# Patient Record
Sex: MALE | Race: WHITE | NOT HISPANIC OR LATINO | URBAN - METROPOLITAN AREA
[De-identification: names, ages, dates, MRNs, and addresses within clinical notes are randomized per-mention and may not be internally consistent; named-entity substitution may affect disease eponyms.]

---

## 2023-02-23 PROBLEM — L03.031 PARONYCHIA OF TOE OF RIGHT FOOT: Status: ACTIVE | Noted: 2023-02-23

## 2023-02-23 PROBLEM — R01.1 NEWLY RECOGNIZED HEART MURMUR: Status: ACTIVE | Noted: 2023-02-23

## 2023-02-23 PROBLEM — J30.89 OTHER ALLERGIC RHINITIS: Status: ACTIVE | Noted: 2023-02-23

## 2023-02-23 PROBLEM — R55 SYNCOPE: Status: ACTIVE | Noted: 2023-02-23

## 2023-02-23 PROBLEM — L30.9 DERMATITIS, ECZEMATOID: Status: ACTIVE | Noted: 2023-02-23

## 2023-02-23 PROBLEM — J45.909 ASTHMA (HHS-HCC): Status: ACTIVE | Noted: 2023-02-23

## 2023-02-23 RX ORDER — ALBUTEROL SULFATE 90 UG/1
2 AEROSOL, METERED RESPIRATORY (INHALATION)
COMMUNITY
Start: 2015-09-25 | End: 2023-03-22 | Stop reason: SDUPTHER

## 2023-02-23 RX ORDER — CRISABOROLE 20 MG/G
OINTMENT TOPICAL
COMMUNITY
Start: 2020-02-19

## 2023-02-23 RX ORDER — TRIAMCINOLONE ACETONIDE 1 MG/G
OINTMENT TOPICAL
COMMUNITY
Start: 2016-02-24

## 2023-02-23 RX ORDER — MOMETASONE FUROATE 220 UG/1
INHALANT RESPIRATORY (INHALATION)
COMMUNITY
Start: 2020-06-22 | End: 2023-03-22 | Stop reason: SDUPTHER

## 2023-02-23 RX ORDER — CETIRIZINE HYDROCHLORIDE 10 MG/1
1 TABLET ORAL DAILY
COMMUNITY
Start: 2020-02-19

## 2023-03-09 ENCOUNTER — APPOINTMENT (OUTPATIENT)
Dept: PRIMARY CARE | Facility: CLINIC | Age: 23
End: 2023-03-09
Payer: COMMERCIAL

## 2023-03-09 PROBLEM — L03.031 PARONYCHIA OF TOE OF RIGHT FOOT: Status: RESOLVED | Noted: 2023-02-23 | Resolved: 2023-03-09

## 2023-03-09 PROBLEM — R55 SYNCOPE: Status: RESOLVED | Noted: 2023-02-23 | Resolved: 2023-03-09

## 2023-03-09 ASSESSMENT — ENCOUNTER SYMPTOMS
SORE THROAT: 0
DIARRHEA: 0
COUGH: 0
NERVOUS/ANXIOUS: 0
HEADACHES: 0
CONSTIPATION: 0
EYE REDNESS: 0
RHINORRHEA: 0
VOMITING: 0
PALPITATIONS: 0
ARTHRALGIAS: 0
FATIGUE: 0
BLOOD IN STOOL: 0
HEMATURIA: 0
DYSPHORIC MOOD: 0
EYE DISCHARGE: 0
FEVER: 0
DIZZINESS: 0
POLYPHAGIA: 0
WHEEZING: 0
FREQUENCY: 0
POLYDIPSIA: 0
DYSURIA: 0
CHILLS: 0
NAUSEA: 0
MYALGIAS: 0
ABDOMINAL PAIN: 0
BRUISES/BLEEDS EASILY: 0
ADENOPATHY: 0
SHORTNESS OF BREATH: 0

## 2023-03-09 NOTE — PROGRESS NOTES
Subjective   Patient ID: Jonathan Culver is a 22 y.o. male who presents for No chief complaint on file..  Is pt fasting? ***  Does pt see any providers other than care team below: ***  new pt-marvin-last pcp, others  Last seen by dr rowe in 2020  Put names of providers in care team-not last pcp  last physical  last labs  is pt fasting?  Tdap-last one 2013; would he like to update this today  flu shot unavailable  Gardasil-would he like this today    Family history form      No care team member to display    HPI  Last labs-  Due for labs- ***    No results found for: CHOL  No results found for: TRIG  No results found for: HDL  No results found for: LDL  No results found for: TSH  No components found for: A1C  No components found for: POCA1C  No results found for: ALBUR  No components found for: POCALBUR      Other concerns:    bps at home- ***    ER/urgicare visits in the last year- ***  Hospitalizations in the last year- ***    last colonoscopy/cologuard/FIT (45-75)  FH colon ca-  FH prostate ca-    Exercise- ***  Diet-***   There is no height or weight on file to calculate BMI.    last eye  appt-   No vision issues    last dental appt- ***    BMs-regular  Sleep-able to fall asleep and stay asleep; no snoring or apnea  no cp, swelling, sob, abd pain, n/v/d/c, blood in stool or black stools  STI testing including hiv (age 15-65) and hep c screening (18-79)-***        Immunization History   Administered Date(s) Administered    DTaP 2000, 05/23/2001, 05/31/2002, 02/01/2005    Hep B, Unspecified 2000, 05/23/2001, 05/31/2002    HiB, unspecified 2000, 05/23/2001, 05/31/2002    Influenza, seasonal, injectable 01/08/2013    MMR 05/31/2002, 05/15/2006    Meningococcal, Unknown Serogroups 01/08/2013, 09/21/2018    Polio, Unspecified 2000, 05/23/2001, 05/31/2002, 02/01/2006    Tdap 01/08/2013    Varicella 08/30/2006       fractures in lifetime-***      FH heart attack, heart surgery-***  FH  stroke-***    The ASCVD Risk score (Sandy DK, et al., 2019) failed to calculate for the following reasons:    The 2019 ASCVD risk score is only valid for ages 40 to 79      Review of Systems   Constitutional:  Negative for chills, fatigue and fever.   HENT:  Negative for congestion, ear pain, postnasal drip, rhinorrhea and sore throat.    Eyes:  Negative for discharge, redness and visual disturbance.   Respiratory:  Negative for cough, shortness of breath and wheezing.    Cardiovascular:  Negative for chest pain, palpitations and leg swelling.   Gastrointestinal:  Negative for abdominal pain, blood in stool, constipation, diarrhea, nausea and vomiting.   Endocrine: Negative for cold intolerance, polydipsia, polyphagia and polyuria.   Genitourinary:  Negative for dysuria, frequency, genital sores, hematuria, penile pain, testicular pain and urgency.   Musculoskeletal:  Negative for arthralgias and myalgias.   Skin:  Negative for rash.   Neurological:  Negative for dizziness, syncope and headaches.   Hematological:  Negative for adenopathy. Does not bruise/bleed easily.   Psychiatric/Behavioral:  Negative for dysphoric mood. The patient is not nervous/anxious.        Objective   There were no vitals taken for this visit.   BP Readings from Last 3 Encounters:   12/12/20 130/84   02/19/20 120/64     Wt Readings from Last 3 Encounters:   12/12/20 70.3 kg (155 lb)   06/22/20 66.1 kg (145 lb 12.8 oz)   02/19/20 63.5 kg (140 lb) (25 %, Z= -0.66)*     * Growth percentiles are based on CDC (Boys, 2-20 Years) data.           Physical Exam  Vitals reviewed.   Constitutional:       General: He is not in acute distress.     Appearance: Normal appearance. He is not ill-appearing.   HENT:      Head: Normocephalic.      Right Ear: Tympanic membrane, ear canal and external ear normal.      Left Ear: Tympanic membrane, ear canal and external ear normal.      Nose: Nose normal.      Mouth/Throat:      Mouth: Mucous membranes are  moist.      Pharynx: Oropharynx is clear. No oropharyngeal exudate or posterior oropharyngeal erythema.   Eyes:      Extraocular Movements: Extraocular movements intact.      Conjunctiva/sclera: Conjunctivae normal.      Pupils: Pupils are equal, round, and reactive to light.   Cardiovascular:      Rate and Rhythm: Normal rate and regular rhythm.      Heart sounds: Normal heart sounds. No murmur heard.  Pulmonary:      Effort: Pulmonary effort is normal. No respiratory distress.      Breath sounds: Normal breath sounds. No wheezing, rhonchi or rales.   Abdominal:      General: Bowel sounds are normal. There is no distension.      Palpations: Abdomen is soft. There is no mass.      Tenderness: There is no abdominal tenderness.   Musculoskeletal:         General: No swelling or tenderness. Normal range of motion.      Cervical back: Normal range of motion and neck supple. No tenderness.      Right lower leg: No edema.      Left lower leg: No edema.   Lymphadenopathy:      Cervical: No cervical adenopathy.   Skin:     General: Skin is warm.      Findings: No rash.   Neurological:      General: No focal deficit present.      Mental Status: He is alert and oriented to person, place, and time.      Cranial Nerves: No cranial nerve deficit.   Psychiatric:         Mood and Affect: Mood normal.         Behavior: Behavior normal.         Assessment/Plan   {Assess/PlanSmartLinks:25165}

## 2023-03-21 ASSESSMENT — ENCOUNTER SYMPTOMS
NAUSEA: 0
DYSPHORIC MOOD: 0
NERVOUS/ANXIOUS: 0
DYSURIA: 0
DIZZINESS: 0
RHINORRHEA: 0
ADENOPATHY: 0
WHEEZING: 0
HEMATURIA: 0
CHILLS: 0
COUGH: 0
EYE REDNESS: 0
PALPITATIONS: 0
POLYDIPSIA: 0
BRUISES/BLEEDS EASILY: 0
MYALGIAS: 0
ABDOMINAL PAIN: 0
SORE THROAT: 0
SHORTNESS OF BREATH: 0
FEVER: 0
DIARRHEA: 0
VOMITING: 0
FATIGUE: 0
CONSTIPATION: 0
POLYPHAGIA: 0
FREQUENCY: 0
HEADACHES: 0
ARTHRALGIAS: 0
BLOOD IN STOOL: 0
EYE DISCHARGE: 0

## 2023-03-21 NOTE — PROGRESS NOTES
Subjective   Patient ID: Jonathan Culver is a 22 y.o. male who presents for Med Refill (Medication refills for albuterol inhaler and asmanex//PCP was in this practice but doesn't remember name, doesn't think it was [facility]/Dr Deepali Oliveira for Asthma/Labs - unsure/Tdap -2 years ago/Flu and Gardasil - declined/Albut - 1 mo ago/Asmanex - years/Echo - no/).    new pt-marvin-last pcp, others?  Put names of providers in care team-not last pcp  last physical >1 yr  last labs >1yr  is pt fasting?no  Tdap-has pt had one since 2013 like for a cut bite wound? If not would he like to update this today? 2yrs ago  flu shot unavailable  Gardasil-would he like this today? no  Last use of albuterol inh-1 mon ago  Using asmanex daily consistently? no  Did pt do echo re: heart murmur? no  Family history form      No care team member to display    HPI  Last labs->1yr  Due for labs- all    No results found for: CHOL  No results found for: TRIG  No results found for: HDL  No results found for: LDL  No results found for: TSH  No components found for: A1C  No components found for: POCA1C  No results found for: ALBUR  No components found for: POCALBUR      Other concerns:none    bps at home- none    ER/urgicare visits in the last year- none  Hospitalizations in the last year- none    FH colon ca-none  FH prostate ca-pgf      Exercise- work  Diet-1-3meals a day   Body mass index is 26.08 kg/m².      last dental appt- >1yr    BMs-regular  Sleep-able to fall asleep and stay asleep; no snoring or apnea  no cp, swelling, sob, abd pain, n/v/d/c, blood in stool or black stools  STI testing including hiv (age 15-65) and hep c screening (18-79)-declines        Immunization History   Administered Date(s) Administered    DTaP 2000, 05/23/2001, 05/31/2002, 02/01/2005    Hep B, Unspecified 2000, 05/23/2001, 05/31/2002    HiB, unspecified 2000, 05/23/2001, 05/31/2002    Influenza, seasonal, injectable 01/08/2013    MMR 05/31/2002,  05/15/2006    Meningococcal, Unknown Serogroups 01/08/2013, 09/21/2018    Polio, Unspecified 2000, 05/23/2001, 05/31/2002, 02/01/2006    Tdap 01/08/2013    Varicella 08/30/2006       fractures in lifetime-rt wrist      FH heart attack, heart surgery-none  FH stroke-none    The ASCVD Risk score (Sandy DK, et al., 2019) failed to calculate for the following reasons:    The 2019 ASCVD risk score is only valid for ages 40 to 79      Review of Systems   Constitutional:  Negative for chills, fatigue and fever.   HENT:  Negative for congestion, ear pain, postnasal drip, rhinorrhea and sore throat.    Eyes:  Negative for discharge, redness and visual disturbance.   Respiratory:  Negative for cough, shortness of breath and wheezing.    Cardiovascular:  Negative for chest pain, palpitations and leg swelling.   Gastrointestinal:  Negative for abdominal pain, blood in stool, constipation, diarrhea, nausea and vomiting.   Endocrine: Negative for cold intolerance, polydipsia, polyphagia and polyuria.   Genitourinary:  Negative for dysuria, frequency, genital sores, hematuria, penile pain, testicular pain and urgency.   Musculoskeletal:  Negative for arthralgias and myalgias.   Skin:  Negative for rash.   Neurological:  Negative for dizziness, syncope and headaches.   Hematological:  Negative for adenopathy. Does not bruise/bleed easily.   Psychiatric/Behavioral:  Negative for dysphoric mood. The patient is not nervous/anxious.        Objective   Visit Vitals  /81 (BP Location: Right arm, Patient Position: Sitting, BP Cuff Size: Large adult)   Pulse 104   Temp 36.8 °C (98.2 °F)      BP Readings from Last 3 Encounters:   03/22/23 136/81   12/12/20 130/84   02/19/20 120/64     Wt Readings from Last 3 Encounters:   03/22/23 80.1 kg (176 lb 9.4 oz)   12/12/20 70.3 kg (155 lb)   06/22/20 66.1 kg (145 lb 12.8 oz)           Physical Exam  Vitals reviewed.   Constitutional:       General: He is not in acute distress.      Appearance: Normal appearance. He is not ill-appearing.   HENT:      Head: Normocephalic.      Right Ear: Tympanic membrane, ear canal and external ear normal.      Left Ear: Tympanic membrane, ear canal and external ear normal.      Nose: Nose normal.      Mouth/Throat:      Mouth: Mucous membranes are moist.      Pharynx: Oropharynx is clear. No oropharyngeal exudate or posterior oropharyngeal erythema.   Eyes:      Extraocular Movements: Extraocular movements intact.      Conjunctiva/sclera: Conjunctivae normal.      Pupils: Pupils are equal, round, and reactive to light.   Cardiovascular:      Rate and Rhythm: Normal rate and regular rhythm.      Heart sounds: Normal heart sounds. No murmur heard.  Pulmonary:      Effort: Pulmonary effort is normal. No respiratory distress.      Breath sounds: Normal breath sounds. No wheezing, rhonchi or rales.   Abdominal:      General: Bowel sounds are normal. There is no distension.      Palpations: Abdomen is soft. There is no mass.      Tenderness: There is no abdominal tenderness.   Musculoskeletal:         General: No swelling or tenderness. Normal range of motion.      Cervical back: Normal range of motion and neck supple. No tenderness.      Right lower leg: No edema.      Left lower leg: No edema.   Lymphadenopathy:      Cervical: No cervical adenopathy.   Skin:     General: Skin is warm.      Findings: No rash.   Neurological:      General: No focal deficit present.      Mental Status: He is alert and oriented to person, place, and time.      Cranial Nerves: No cranial nerve deficit.   Psychiatric:         Mood and Affect: Mood normal.         Behavior: Behavior normal.         Assessment/Plan   1. Routine general medical examination at a health care facility    - Comprehensive Metabolic Panel; Future  - CBC and Auto Differential; Future  - Lipid Panel; Future  - TSH with reflex to Free T4 if abnormal; Future  - Comprehensive Metabolic Panel  - CBC and Auto  Differential  - Lipid Panel  - TSH with reflex to Free T4 if abnormal    2. Newly recognized heart murmur  echo    3. Mild persistent asthma without complication    - albuterol 90 mcg/actuation inhaler; Inhale 2 puffs every 6 hours if needed for wheezing.  Dispense: 18 g; Refill: 1  - mometasone (Asmanex Twisthaler) 220 mcg/ actuation (30) aerosol powdr breath activated; INHALE ONE PUFF BY MOUTH AT BEDTIME  Dispense: 3 each; Refill: 3    4. BMI 26.0-26.9,adult

## 2023-03-22 ENCOUNTER — OFFICE VISIT (OUTPATIENT)
Dept: PRIMARY CARE | Facility: CLINIC | Age: 23
End: 2023-03-22
Payer: COMMERCIAL

## 2023-03-22 VITALS
BODY MASS INDEX: 26.08 KG/M2 | SYSTOLIC BLOOD PRESSURE: 136 MMHG | WEIGHT: 176.59 LBS | TEMPERATURE: 98.2 F | OXYGEN SATURATION: 97 % | DIASTOLIC BLOOD PRESSURE: 81 MMHG | HEART RATE: 104 BPM

## 2023-03-22 DIAGNOSIS — Z00.00 ROUTINE GENERAL MEDICAL EXAMINATION AT A HEALTH CARE FACILITY: Primary | ICD-10-CM

## 2023-03-22 DIAGNOSIS — J45.30 MILD PERSISTENT ASTHMA WITHOUT COMPLICATION (HHS-HCC): ICD-10-CM

## 2023-03-22 DIAGNOSIS — R01.1 NEWLY RECOGNIZED HEART MURMUR: ICD-10-CM

## 2023-03-22 PROCEDURE — 99395 PREV VISIT EST AGE 18-39: CPT | Performed by: NURSE PRACTITIONER

## 2023-03-22 PROCEDURE — 1036F TOBACCO NON-USER: CPT | Performed by: NURSE PRACTITIONER

## 2023-03-22 RX ORDER — ALBUTEROL SULFATE 90 UG/1
2 AEROSOL, METERED RESPIRATORY (INHALATION) EVERY 6 HOURS PRN
Qty: 18 G | Refills: 1 | Status: SHIPPED | OUTPATIENT
Start: 2023-03-22 | End: 2023-05-08

## 2023-03-22 RX ORDER — MOMETASONE FUROATE 220 UG/1
INHALANT RESPIRATORY (INHALATION)
Qty: 3 EACH | Refills: 3 | Status: SHIPPED | OUTPATIENT
Start: 2023-03-22

## 2023-03-22 ASSESSMENT — PATIENT HEALTH QUESTIONNAIRE - PHQ9
1. LITTLE INTEREST OR PLEASURE IN DOING THINGS: NOT AT ALL
SUM OF ALL RESPONSES TO PHQ9 QUESTIONS 1 AND 2: 0
2. FEELING DOWN, DEPRESSED OR HOPELESS: NOT AT ALL

## 2023-03-22 NOTE — PATIENT INSTRUCTIONS
See dentist    Set up echo appt    Meds refilled. The number of refills on the meds match when you need to return to the office for an appt. I recommend making your next appt today so you don't run out of your medication as it may take me up to 3 days to refill it.    Handouts given to pt:  physical handout            I recommend signing up for MyChart.    Labs:    You can use the lab in our building when fasting. The hrs are: Monday-Thursday, 7 a.m. - 6 p.m., Friday, 7 a.m. - 5 p.m., Saturday 8 a.m. - 12 noon.   No appt needed, BUT YOU DO NEED THE PAPER ORDER.    Fasting is no food, drink, gum or mints other than water for 12 hrs.   Results will be back in 2-3 business days for most labs. It is always recommended for any orders (labs, xrays, ultrasounds,MRI, ct scan, procedures etc) to check with your insurance provider for expected costs or expenses to you.       You will get your results via phone from my medical assistant if you do not have MyChart.  OR  You will get your results via [facility]    If a result is urgent, I will call to speak to you.    Vaccines:  ---- Gardasil-declines    General recommendations:  Exercise-cardio 4-5d/wk 30min each day  Diet-Breakfast-toast (my favorite Zitadiego Brown Delighful Multigrain or Valdemar's Killer Bread Good Seed thin-sliced)/bagel/English muffin-whole wheat flour as a 1st ingredient or cereal/oatmeal/granola bar-fiber 4g or more or protein like eggs or peanut butter; optional veggies  Lunch-protein, 1/2c carb or 2 slices bread, veg 1c  Dinner-protein, fist sized carb, veg 1c  Fruit 2 a day  Dairy 2 a day-milk, soy milk, almond milk, cheese, yogurt, cottage cheese  Snacks-Protein-hard boiled egg, nuts (walnuts/almonds/pecans/pistachios 1/4c), hummus, beef/deer jerky or meat sticks; vegetable, fruit, dairy-milk(1%, skim, almond, soy)/cheese (not a lot of cheddar)/yogurt (Greek is best-my favorite Dannon Fruit on the Bottom Greek)/cottage cheese 2%; triscuits/ popcorn/wheat thins  have a lot of fiber; follow serving size on bag/box/container  increase water  Limit alcohol to 1 drink per day for women and 2 drinks per day for men (1 drink=12oz beer or 5oz wine or 1 1/2oz liquor)  Calcium: 500mg 1 twice a day if age 50 and younger and 600mg 1 twice a day if over age 50 (calcium citrate can be taken without food)  Vitamin D: 800-5000 IU/day  Limit salt to <2300mg a day if age 50 and under and <1500mg a day if over age 50/have high bp or diabetes or kidney disease  Recommend folate for childbearing age women 0.4mg per day (can be found in a multivitamin)  Recommend 18mg/dL of iron a day if age 50 and under and 8mg/dL a day if over age 50; take on an empty stomach at bedtime  Use sunscreen   Wear seatbelt  Recommend safe sex practices: using condoms everytime you have sex, discuss with a new partner about their past partners/history of STDs/drug use, avoid drinking alcohol or using drugs as this increases the chance that you will participate in high-risk sex, for oral sex help protect your mouth by having your partner use a condom (male or female), women should not douche after sex, be aware of your partner's body and your body-look for signs of a sore, blister, rash, or discharge, and have regular exams and periodic tests for STDs.  No distracted driving  No driving when under influence of substances  Wear a seatbelt  Eye dr every 1-2yrs  Dentist every 6-12 mon  Tetanus shot every 10yrs  Recommend flu vaccine in the fall  Appt in  1 year for physical      I will communicate with you via [facility] regarding messages and results. If you need help with this, you can call the support line at [phone].    IT WAS A PLEASURE TO SEE YOU TODAY. THANK YOU FOR CHOOSING US FOR YOUR HEALTHCARE NEEDS.

## 2023-05-07 DIAGNOSIS — J45.30 MILD PERSISTENT ASTHMA WITHOUT COMPLICATION (HHS-HCC): ICD-10-CM

## 2023-05-08 RX ORDER — ALBUTEROL SULFATE 90 UG/1
AEROSOL, METERED RESPIRATORY (INHALATION)
Qty: 18 G | Refills: 1 | Status: SHIPPED | OUTPATIENT
Start: 2023-05-08 | End: 2023-09-25

## 2023-09-25 DIAGNOSIS — J45.30 MILD PERSISTENT ASTHMA WITHOUT COMPLICATION (HHS-HCC): ICD-10-CM

## 2023-09-25 RX ORDER — ALBUTEROL SULFATE 90 UG/1
AEROSOL, METERED RESPIRATORY (INHALATION)
Qty: 18 G | Refills: 1 | Status: SHIPPED | OUTPATIENT
Start: 2023-09-25 | End: 2024-01-09

## 2024-01-09 ENCOUNTER — TELEPHONE (OUTPATIENT)
Dept: PRIMARY CARE | Facility: CLINIC | Age: 24
End: 2024-01-09
Payer: COMMERCIAL

## 2024-01-09 DIAGNOSIS — J45.30 MILD PERSISTENT ASTHMA WITHOUT COMPLICATION (HHS-HCC): ICD-10-CM

## 2024-01-09 RX ORDER — ALBUTEROL SULFATE 90 UG/1
AEROSOL, METERED RESPIRATORY (INHALATION)
Qty: 18 G | Refills: 1 | Status: SHIPPED | OUTPATIENT
Start: 2024-01-09

## 2024-01-09 NOTE — LETTER
January 26, 2024     Jonathan Culver  [id number] [facility] [id number]    Patient: Moshealla BRANDYN Culver   YOB: 2000   Date of Visit: 1/9/2024         Dear Jonathan,    We have made several attempts to contact you by phone regarding setting you up for a physical.  You have no voicemail so we couldn't leave any messages.    Please call to our office at [phone] option 1 so that we can get you set up for an appt.      Sincerely,          Clarissa Escalante, APRN-CNP